# Patient Record
Sex: FEMALE | Race: WHITE | NOT HISPANIC OR LATINO | URBAN - METROPOLITAN AREA
[De-identification: names, ages, dates, MRNs, and addresses within clinical notes are randomized per-mention and may not be internally consistent; named-entity substitution may affect disease eponyms.]

---

## 2017-01-06 ENCOUNTER — OFFICE (OUTPATIENT)
Dept: URBAN - METROPOLITAN AREA CLINIC 12 | Facility: CLINIC | Age: 32
End: 2017-01-06

## 2017-01-06 VITALS
WEIGHT: 180 LBS | HEART RATE: 75 BPM | HEIGHT: 65 IN | DIASTOLIC BLOOD PRESSURE: 62 MMHG | SYSTOLIC BLOOD PRESSURE: 108 MMHG

## 2017-01-06 DIAGNOSIS — K58.0 IRRITABLE BOWEL SYNDROME WITH DIARRHEA: ICD-10-CM

## 2017-01-06 DIAGNOSIS — R10.9 UNSPECIFIED ABDOMINAL PAIN: ICD-10-CM

## 2017-01-06 PROCEDURE — 99213 OFFICE O/P EST LOW 20 MIN: CPT | Performed by: INTERNAL MEDICINE

## 2017-01-06 RX ORDER — RIFAXIMIN 550 MG/1
TABLET ORAL
Qty: 42 | Refills: 0 | Status: ACTIVE
Start: 2017-01-06

## 2017-01-06 RX ORDER — HYOSCYAMINE SULFATE 0.12 MG/1
TABLET, ORALLY DISINTEGRATING ORAL
Qty: 60 | Refills: 1 | Status: ACTIVE
Start: 2017-01-06

## 2017-01-06 NOTE — SERVICEHPINOTES
31-year-old  female comes here for follow-up of her IBS .  She has IBS for more than 10 years mostly with constipation as per her .  At the beginning of last year she had transverse myelitis after which constipation was worse for a few months.  Her usual bowel movements are constipation for 2-3 days followed by diarrhea for 2-3 days.  In the last about 2-3 weeks she's been having bowel movement every time she would eat, with abdominal cramping throughout the abdomen.  No fever, no hematochezia and no vomiting.  In the last 2 days, she has felt a little better but not completely.

## 2017-01-06 NOTE — SERVICENOTES
Looks like IBS-D. check stool.  Prior workup and noted, no celiac.  Colonic and small bowel biopsies are negative.  rx with antispasmodic and Xifaxan

## 2017-03-03 ENCOUNTER — OFFICE (OUTPATIENT)
Dept: URBAN - METROPOLITAN AREA CLINIC 12 | Facility: CLINIC | Age: 32
End: 2017-03-03

## 2017-03-03 VITALS
HEART RATE: 70 BPM | HEIGHT: 65 IN | DIASTOLIC BLOOD PRESSURE: 71 MMHG | SYSTOLIC BLOOD PRESSURE: 107 MMHG | WEIGHT: 172 LBS

## 2017-03-03 DIAGNOSIS — K58.2 MIXED IRRITABLE BOWEL SYNDROME: ICD-10-CM

## 2017-03-03 PROCEDURE — 99212 OFFICE O/P EST SF 10 MIN: CPT | Performed by: INTERNAL MEDICINE
